# Patient Record
Sex: FEMALE | Race: WHITE
[De-identification: names, ages, dates, MRNs, and addresses within clinical notes are randomized per-mention and may not be internally consistent; named-entity substitution may affect disease eponyms.]

---

## 2020-02-03 ENCOUNTER — APPOINTMENT (OUTPATIENT)
Dept: OTOLARYNGOLOGY | Facility: CLINIC | Age: 30
End: 2020-02-03
Payer: COMMERCIAL

## 2020-02-03 VITALS
BODY MASS INDEX: 20.83 KG/M2 | HEIGHT: 65 IN | DIASTOLIC BLOOD PRESSURE: 70 MMHG | HEART RATE: 69 BPM | SYSTOLIC BLOOD PRESSURE: 106 MMHG | WEIGHT: 125 LBS

## 2020-02-03 DIAGNOSIS — Z78.9 OTHER SPECIFIED HEALTH STATUS: ICD-10-CM

## 2020-02-03 PROBLEM — Z00.00 ENCOUNTER FOR PREVENTIVE HEALTH EXAMINATION: Status: ACTIVE | Noted: 2020-02-03

## 2020-02-03 PROCEDURE — 99203 OFFICE O/P NEW LOW 30 MIN: CPT

## 2020-02-03 RX ORDER — IBUPROFEN 200 MG/1
TABLET, COATED ORAL
Refills: 0 | Status: ACTIVE | COMMUNITY

## 2020-02-03 NOTE — CONSULT LETTER
[Consult Letter:] : I had the pleasure of evaluating your patient, [unfilled]. [Dear  ___] : Dear  [unfilled], [Please see my note below.] : Please see my note below. [Consult Closing:] : Thank you very much for allowing me to participate in the care of this patient.  If you have any questions, please do not hesitate to contact me. [Sincerely,] : Sincerely, [FreeTextEntry3] : Irene Cleaning MD\par

## 2020-02-03 NOTE — HISTORY OF PRESENT ILLNESS
[de-identified] : BOBBY SINGER is a 29 year patient In consultation for left facial swelling. She was referred by Dr. Cunningham. On Tuesday 6 days ago, she said the left side of her face felt strange. She developed swelling on Wednesday. It increased in size. She went to urgent care on Friday. She start taking amoxicillin on Saturday. She has tenderness, swelling, and mild discomfort. She is not feeling better. She said laboratory testing for mumps and other infections is pending. She does have more pain with chewing. She has no prior history of facial swelling or sialoadenitis. She has no difficulty breathing, throat pain, voice change, or fevers.

## 2020-02-03 NOTE — ASSESSMENT
[FreeTextEntry1] : She has symptoms and findings of acute left parotitis. I did not palpate an obvious mass or stone. Facial nerve is intact. There was no induration, cellulitis, or obvious abscess\par \par Plan\par -Findings and management options were discussed with the patient.\par -hydration\par -massage of the gland\par -warm compresses/heating pad as needed\par -sialogogues as needed. \par -I am switching her to Augmentin. I am also adding a short burst of prednisone. She has taken prednisone in the past  I reviewed some of the associated risks including the risk of mood changes, GI complications and bone issues/fractures. She should take medication with food. I also asked her to consider taking Pepcid while on it. \par -If she is not improving or has worsening symptoms, we will obtain an imaging study.\par -I will see her back in one week. I asked her to call earlier if she has any worsening symptoms

## 2020-02-12 ENCOUNTER — APPOINTMENT (OUTPATIENT)
Dept: OTOLARYNGOLOGY | Facility: CLINIC | Age: 30
End: 2020-02-12
Payer: COMMERCIAL

## 2020-02-12 DIAGNOSIS — K11.20 SIALOADENITIS, UNSPECIFIED: ICD-10-CM

## 2020-02-12 PROCEDURE — 99213 OFFICE O/P EST LOW 20 MIN: CPT

## 2020-02-12 RX ORDER — PREDNISONE 10 MG/1
10 TABLET ORAL
Qty: 12 | Refills: 0 | Status: COMPLETED | COMMUNITY
Start: 2020-02-03 | End: 2020-02-12

## 2020-02-12 RX ORDER — AMOXICILLIN 875 MG/1
875 TABLET, FILM COATED ORAL
Qty: 14 | Refills: 0 | Status: COMPLETED | COMMUNITY
Start: 2020-01-31 | End: 2020-02-12

## 2020-02-12 RX ORDER — AMOXICILLIN AND CLAVULANATE POTASSIUM 875; 125 MG/1; MG/1
875-125 TABLET, COATED ORAL
Qty: 14 | Refills: 0 | Status: COMPLETED | COMMUNITY
Start: 2020-02-03 | End: 2020-02-12

## 2020-02-12 NOTE — CONSULT LETTER
[Dear  ___] : Dear  [unfilled], [Courtesy Letter:] : I had the pleasure of seeing your patient, [unfilled], in my office today. [Please see my note below.] : Please see my note below. [Consult Closing:] : Thank you very much for allowing me to participate in the care of this patient.  If you have any questions, please do not hesitate to contact me. [Sincerely,] : Sincerely, [FreeTextEntry3] : Irene Cleaning MD\par

## 2020-02-12 NOTE — HISTORY OF PRESENT ILLNESS
[de-identified] : 2/3/20- BOBBY SINGER is a 29 year patient In consultation for left facial swelling. She was referred by Dr. Cunningham. On Tuesday 6 days ago, she said the left side of her face felt strange. She developed swelling on Wednesday. It increased in size. She went to urgent care on Friday. She start taking amoxicillin on Saturday. She has tenderness, swelling, and mild discomfort. She is not feeling better. She said laboratory testing for mumps and other infections is pending. She does have more pain with chewing. She has no prior history of facial swelling or sialoadenitis. She has no difficulty breathing, throat pain, voice change, or fevers.\par  [FreeTextEntry1] : \par 2/12/20- Price Barba is here for follow up for parotitis. She finished the antibiotics and steroids. She feels much better. The swelling has completely resolved. She had her laboratory results with her. Amylase was elevated but the rest of the testing was normal

## 2020-02-12 NOTE — ASSESSMENT
[FreeTextEntry1] : She had left parotitis which has resolved. Her exam was normal. There was no obvious mass palpated.\par \par Plan\par -Findings and management options were discussed with the patient.  We discussed possible etiologies of the parotitis- such as infection, sialolithiasis, autoimmune disease and mass. \par -hydration\par -we discussed workup options. Her options are observation, ultrasound, and CT scan. At this point, she is going to observe it since she feels much better and her exam is normal.\par -I will see her back in one month.  I asked her to call earlier if she has any recurrent symptoms or concerns.

## 2020-03-13 ENCOUNTER — APPOINTMENT (OUTPATIENT)
Dept: OTOLARYNGOLOGY | Facility: CLINIC | Age: 30
End: 2020-03-13